# Patient Record
Sex: FEMALE | Race: BLACK OR AFRICAN AMERICAN | NOT HISPANIC OR LATINO | URBAN - NONMETROPOLITAN AREA
[De-identification: names, ages, dates, MRNs, and addresses within clinical notes are randomized per-mention and may not be internally consistent; named-entity substitution may affect disease eponyms.]

---

## 2018-09-27 ENCOUNTER — OFFICE (OUTPATIENT)
Dept: URBAN - NONMETROPOLITAN AREA CLINIC 13 | Facility: CLINIC | Age: 43
End: 2018-09-27

## 2018-09-27 ENCOUNTER — OFFICE (OUTPATIENT)
Dept: URBAN - NONMETROPOLITAN AREA CLINIC 13 | Facility: CLINIC | Age: 43
End: 2018-09-27
Payer: COMMERCIAL

## 2018-09-27 VITALS
WEIGHT: 293 LBS | DIASTOLIC BLOOD PRESSURE: 99 MMHG | HEART RATE: 87 BPM | HEIGHT: 64 IN | SYSTOLIC BLOOD PRESSURE: 125 MMHG | DIASTOLIC BLOOD PRESSURE: 88 MMHG | SYSTOLIC BLOOD PRESSURE: 149 MMHG | OXYGEN SATURATION: 98 %

## 2018-09-27 VITALS — HEIGHT: 64 IN

## 2018-09-27 DIAGNOSIS — R11.0 NAUSEA: ICD-10-CM

## 2018-09-27 DIAGNOSIS — R13.10 DYSPHAGIA, UNSPECIFIED: ICD-10-CM

## 2018-09-27 DIAGNOSIS — Z79.899 OTHER LONG TERM (CURRENT) DRUG THERAPY: ICD-10-CM

## 2018-09-27 DIAGNOSIS — K59.00 CONSTIPATION, UNSPECIFIED: ICD-10-CM

## 2018-09-27 DIAGNOSIS — E83.52 HYPERCALCEMIA: ICD-10-CM

## 2018-09-27 DIAGNOSIS — R10.826 EPIGASTRIC REBOUND ABDOMINAL TENDERNESS: ICD-10-CM

## 2018-09-27 DIAGNOSIS — R10.13 EPIGASTRIC PAIN: ICD-10-CM

## 2018-09-27 DIAGNOSIS — K21.9 GASTRO-ESOPHAGEAL REFLUX DISEASE WITHOUT ESOPHAGITIS: ICD-10-CM

## 2018-09-27 LAB
CBC SYSMEX: HEMATOCRIT: 38 % (ref 37–47)
CBC SYSMEX: HEMOGLOBIN: 13.2 G/DL (ref 12–14)
CBC SYSMEX: LYMPHS %: 39.4 % (ref 20.5–40.5)
CBC SYSMEX: LYMPHS ABSOLUTE: 3.3 10*3U/L — HIGH (ref 1.3–2.9)
CBC SYSMEX: MCH: 32.2 PG — HIGH (ref 27–32)
CBC SYSMEX: MCHC: 34.7 G/DL (ref 28.5–35)
CBC SYSMEX: MCV: 92.7 FL (ref 84–100)
CBC SYSMEX: MONOS %: 8 % (ref 2–10)
CBC SYSMEX: MONOS ABSOLUTE: 0.7 10*3U/L (ref 0.3–3.8)
CBC SYSMEX: MPV: 8.6 FL (ref 8.3–11.9)
CBC SYSMEX: NEUT. %: 52.6 % (ref 43–67)
CBC SYSMEX: NEUT. ABSOLUTE: 4.3 10*3U/L (ref 2.2–4.8)
CBC SYSMEX: PLATELETS: 376 10*3U/L (ref 130–440)
CBC SYSMEX: RBC: 4.1 10*6U/L — LOW (ref 4.2–5.4)
CBC SYSMEX: RDW: 13.8 % (ref 11.5–15.5)
CBC SYSMEX: WBC: 8.3 10*3U/L (ref 4.5–10.5)
COMPLETE METABOLIC: ALBUMIN: 4.2 G/DL (ref 3.5–5.2)
COMPLETE METABOLIC: ALK. PHOS: 121 U/L (ref 40–150)
COMPLETE METABOLIC: ALT: 15 U/L (ref 0–55)
COMPLETE METABOLIC: AST: 13 U/L (ref 5–34)
COMPLETE METABOLIC: BUN: 9 MG/DL (ref 7–26)
COMPLETE METABOLIC: CALCIUM: 10.6 MG/DL — HIGH (ref 8.4–10.3)
COMPLETE METABOLIC: CHLORIDE: 102 MMOL/L (ref 98–107)
COMPLETE METABOLIC: CO2: 28 MEQ/L (ref 22–29)
COMPLETE METABOLIC: CREATININE: 0.8 MG/DL (ref 0.57–1.25)
COMPLETE METABOLIC: GFR - AFRICAN AMERICAN: 100.4 (ref 59–200)
COMPLETE METABOLIC: GFR - NON AFRICAN AMERICAN: 82.8 (ref 59–200)
COMPLETE METABOLIC: GLUCOSE: 100 MG/DL — HIGH (ref 70–99)
COMPLETE METABOLIC: POTASSIUM: 4.4 MMOL/L (ref 3.5–5.3)
COMPLETE METABOLIC: SODIUM: 139 MMOL/L (ref 136–145)
COMPLETE METABOLIC: TOTAL BILIRUBIN: 0.4 MG/DL (ref 0.2–1.2)
COMPLETE METABOLIC: TOTAL PROTEIN: 7 G/DL (ref 6.4–8.3)

## 2018-09-27 PROCEDURE — 76700 US EXAM ABDOM COMPLETE: CPT

## 2018-09-27 PROCEDURE — 36415 COLL VENOUS BLD VENIPUNCTURE: CPT | Performed by: INTERNAL MEDICINE

## 2018-09-27 PROCEDURE — 85025 COMPLETE CBC W/AUTO DIFF WBC: CPT

## 2018-09-27 PROCEDURE — 99214 OFFICE O/P EST MOD 30 MIN: CPT | Mod: 25

## 2018-09-27 PROCEDURE — 80053 COMPREHEN METABOLIC PANEL: CPT

## 2018-09-27 RX ORDER — POLYETHYLENE GLYCOL 3350 17 G/17G
POWDER, FOR SOLUTION ORAL
Qty: 527 | Refills: 0 | Status: ACTIVE
Start: 2018-09-27

## 2018-10-02 ENCOUNTER — OFFICE (OUTPATIENT)
Dept: URBAN - NONMETROPOLITAN AREA CLINIC 13 | Facility: CLINIC | Age: 43
End: 2018-10-02

## 2018-10-02 VITALS — HEIGHT: 64 IN

## 2018-10-02 DIAGNOSIS — R10.826 EPIGASTRIC REBOUND ABDOMINAL TENDERNESS: ICD-10-CM

## 2018-10-02 LAB
AMYLASE: 91.8 U/L (ref 25–125)
LIPASE: 20 U/L (ref 8–78)

## 2018-10-02 PROCEDURE — 36415 COLL VENOUS BLD VENIPUNCTURE: CPT

## 2018-10-02 PROCEDURE — 82150 ASSAY OF AMYLASE: CPT

## 2018-10-02 PROCEDURE — 83690 ASSAY OF LIPASE: CPT

## 2018-10-24 ENCOUNTER — AMBULATORY SURGICAL CENTER (OUTPATIENT)
Dept: URBAN - NONMETROPOLITAN AREA SURGERY 2 | Facility: SURGERY | Age: 43
End: 2018-10-24
Payer: COMMERCIAL

## 2018-10-24 ENCOUNTER — OFFICE (OUTPATIENT)
Dept: URBAN - NONMETROPOLITAN AREA CLINIC 13 | Facility: CLINIC | Age: 43
End: 2018-10-24
Payer: COMMERCIAL

## 2018-10-24 VITALS
OXYGEN SATURATION: 97 % | RESPIRATION RATE: 22 BRPM | DIASTOLIC BLOOD PRESSURE: 88 MMHG | SYSTOLIC BLOOD PRESSURE: 146 MMHG | RESPIRATION RATE: 18 BRPM | SYSTOLIC BLOOD PRESSURE: 137 MMHG | HEART RATE: 98 BPM | SYSTOLIC BLOOD PRESSURE: 142 MMHG | WEIGHT: 293 LBS | OXYGEN SATURATION: 96 % | HEIGHT: 64 IN | HEART RATE: 92 BPM | DIASTOLIC BLOOD PRESSURE: 90 MMHG | DIASTOLIC BLOOD PRESSURE: 83 MMHG | HEART RATE: 69 BPM | OXYGEN SATURATION: 100 % | SYSTOLIC BLOOD PRESSURE: 140 MMHG | DIASTOLIC BLOOD PRESSURE: 97 MMHG | SYSTOLIC BLOOD PRESSURE: 144 MMHG | HEART RATE: 91 BPM | OXYGEN SATURATION: 99 % | SYSTOLIC BLOOD PRESSURE: 136 MMHG | RESPIRATION RATE: 20 BRPM | DIASTOLIC BLOOD PRESSURE: 91 MMHG

## 2018-10-24 DIAGNOSIS — K22.2 ESOPHAGEAL OBSTRUCTION: ICD-10-CM

## 2018-10-24 DIAGNOSIS — K21.9 GASTRO-ESOPHAGEAL REFLUX DISEASE WITHOUT ESOPHAGITIS: ICD-10-CM

## 2018-10-24 DIAGNOSIS — K44.9 DIAPHRAGMATIC HERNIA WITHOUT OBSTRUCTION OR GANGRENE: ICD-10-CM

## 2018-10-24 DIAGNOSIS — K31.89 OTHER DISEASES OF STOMACH AND DUODENUM: ICD-10-CM

## 2018-10-24 DIAGNOSIS — K29.70 GASTRITIS, UNSPECIFIED, WITHOUT BLEEDING: ICD-10-CM

## 2018-10-24 DIAGNOSIS — R13.10 DYSPHAGIA, UNSPECIFIED: ICD-10-CM

## 2018-10-24 PROCEDURE — 88305 TISSUE EXAM BY PATHOLOGIST: CPT | Mod: TC | Performed by: INTERNAL MEDICINE

## 2018-10-24 PROCEDURE — 43248 EGD GUIDE WIRE INSERTION: CPT | Performed by: INTERNAL MEDICINE

## 2018-10-24 PROCEDURE — 00731 ANES UPR GI NDSC PX NOS: CPT | Mod: QS,QZ

## 2018-10-24 PROCEDURE — 43239 EGD BIOPSY SINGLE/MULTIPLE: CPT | Mod: 59 | Performed by: INTERNAL MEDICINE

## 2018-10-24 RX ORDER — RANITIDINE HYDROCHLORIDE 300 MG/1
TABLET, FILM COATED ORAL
Qty: 30 | Refills: 5 | Status: COMPLETED
Start: 2016-06-21 | End: 2018-10-24

## 2018-10-24 RX ORDER — OMEPRAZOLE 40 MG/1
CAPSULE, DELAYED RELEASE PELLETS ORAL
Qty: 0 | Refills: 0 | Status: COMPLETED
End: 2018-10-24

## 2018-10-24 RX ORDER — PANTOPRAZOLE SODIUM 40 MG/1
TABLET, DELAYED RELEASE ORAL
Qty: 30 | Refills: 5 | Status: ACTIVE
Start: 2018-10-24

## 2018-10-24 RX ORDER — FAMOTIDINE 20 MG/1
TABLET, FILM COATED ORAL
Qty: 60 | Refills: 5 | Status: ACTIVE
Start: 2018-10-24

## 2018-10-26 LAB — SURGICAL PROCEDURE: PDF REPORT: (no result)

## 2023-11-14 ENCOUNTER — OFFICE (OUTPATIENT)
Dept: URBAN - NONMETROPOLITAN AREA CLINIC 13 | Facility: CLINIC | Age: 48
End: 2023-11-14
Payer: COMMERCIAL

## 2023-11-14 VITALS
SYSTOLIC BLOOD PRESSURE: 135 MMHG | OXYGEN SATURATION: 96 % | SYSTOLIC BLOOD PRESSURE: 143 MMHG | DIASTOLIC BLOOD PRESSURE: 80 MMHG | DIASTOLIC BLOOD PRESSURE: 95 MMHG | WEIGHT: 293 LBS | HEIGHT: 64 IN | HEART RATE: 113 BPM

## 2023-11-14 VITALS — HEIGHT: 64 IN

## 2023-11-14 DIAGNOSIS — K21.9 GASTRO-ESOPHAGEAL REFLUX DISEASE WITHOUT ESOPHAGITIS: ICD-10-CM

## 2023-11-14 DIAGNOSIS — K59.00 CONSTIPATION, UNSPECIFIED: ICD-10-CM

## 2023-11-14 DIAGNOSIS — R10.13 EPIGASTRIC PAIN: ICD-10-CM

## 2023-11-14 DIAGNOSIS — R11.0 NAUSEA: ICD-10-CM

## 2023-11-14 DIAGNOSIS — D64.9 ANEMIA, UNSPECIFIED: ICD-10-CM

## 2023-11-14 DIAGNOSIS — Z12.11 ENCOUNTER FOR SCREENING FOR MALIGNANT NEOPLASM OF COLON: ICD-10-CM

## 2023-11-14 DIAGNOSIS — R10.33 PERIUMBILICAL PAIN: ICD-10-CM

## 2023-11-14 DIAGNOSIS — R13.10 DYSPHAGIA, UNSPECIFIED: ICD-10-CM

## 2023-11-14 LAB
AMYLASE: 71 UNITS/L (ref 30–110)
CBC WITH WBC (DIFF): ACANTHOCYTE: NORMAL
CBC WITH WBC (DIFF): AGRANULAR NEUTROPHIL: NORMAL
CBC WITH WBC (DIFF): ANISOCYTOSIS: NORMAL
CBC WITH WBC (DIFF): ATYPICAL LYMPHOCYTE: NORMAL
CBC WITH WBC (DIFF): AUER RODS: NORMAL
CBC WITH WBC (DIFF): BAND: NORMAL
CBC WITH WBC (DIFF): BASOPHIL: 0 % (ref 0–1)
CBC WITH WBC (DIFF): BASOPHILIC STIPPLING: NORMAL
CBC WITH WBC (DIFF): BI-LOBED NEUTROPHIL: NORMAL
CBC WITH WBC (DIFF): BLAST: NORMAL
CBC WITH WBC (DIFF): BURR CELL: NORMAL
CBC WITH WBC (DIFF): DIMORPHIC RBC POPULATION: NORMAL
CBC WITH WBC (DIFF): DOHLE BODIES: NORMAL
CBC WITH WBC (DIFF): ELLIPTOCYTE: NORMAL
CBC WITH WBC (DIFF): EOSINOPHIL: 2 % (ref 0–5)
CBC WITH WBC (DIFF): GIANT PLATELET: NORMAL
CBC WITH WBC (DIFF): HEMATOCRIT: 38.8 % (ref 36–46)
CBC WITH WBC (DIFF): HEMOGLOBIN: 13.5 G/DL (ref 12–16)
CBC WITH WBC (DIFF): HOWELL JOLLY BODIES: NORMAL
CBC WITH WBC (DIFF): HYPERSEGMENTED NEUTROPHIL: NORMAL
CBC WITH WBC (DIFF): HYPOCHROMIA: NORMAL
CBC WITH WBC (DIFF): HYPOGRANULAR NEUTROPHIL: NORMAL
CBC WITH WBC (DIFF): IMMATURE GRANULOCYTES: 0 % (ref 0–1)
CBC WITH WBC (DIFF): LARGE PLATELET: NORMAL
CBC WITH WBC (DIFF): LYMPHOCYTE: 42 % — HIGH (ref 20–40)
CBC WITH WBC (DIFF): MACROCYTOSIS: NORMAL
CBC WITH WBC (DIFF): MEAN CELL HEMOGLOBIN CONCENTRATION: 34.8 G/DL (ref 33–37)
CBC WITH WBC (DIFF): MEAN CELL HEMOGLOBIN: 32.8 PG — HIGH (ref 27–31)
CBC WITH WBC (DIFF): MEAN CELL VOLUME: 94 FL (ref 84–100)
CBC WITH WBC (DIFF): MEAN PLATELET VOLUME: 8.7 FL (ref 8.3–11.9)
CBC WITH WBC (DIFF): METAMYELOCYTE: NORMAL
CBC WITH WBC (DIFF): MICROCYTOSIS: NORMAL
CBC WITH WBC (DIFF): MIX CELLS: NORMAL
CBC WITH WBC (DIFF): MONOCYTE: 8 % (ref 1–10)
CBC WITH WBC (DIFF): MYELOCYTE: NORMAL
CBC WITH WBC (DIFF): NEUTROPHIL SEGMENTED: 46 % — LOW (ref 50–70)
CBC WITH WBC (DIFF): NOTE: NORMAL
CBC WITH WBC (DIFF): NUCLEATED RBC: 0 /100WBC (ref 0–0)
CBC WITH WBC (DIFF): OVALOCYTE: NORMAL
CBC WITH WBC (DIFF): PAPPENHEIMER BODIES: NORMAL
CBC WITH WBC (DIFF): PATHOLOGIST INTERPRETATION: NORMAL
CBC WITH WBC (DIFF): PLATELET CLUMPS: NORMAL
CBC WITH WBC (DIFF): PLATELET COUNT: 327 /NL (ref 140–440)
CBC WITH WBC (DIFF): PLT SATELLITOSIS: NORMAL
CBC WITH WBC (DIFF): POIKILOCYTOSIS: NORMAL
CBC WITH WBC (DIFF): POLYCHROMASIA: NORMAL
CBC WITH WBC (DIFF): PROMYELOCYTE: NORMAL
CBC WITH WBC (DIFF): RBC MORPHOLOGY: NORMAL
CBC WITH WBC (DIFF): REACT LYMPH ABS MAN: NORMAL
CBC WITH WBC (DIFF): REACTIVE LYMPHOCYTE: NORMAL
CBC WITH WBC (DIFF): RED BLOOD CELL: 4.12 /PL — LOW (ref 4.2–5.4)
CBC WITH WBC (DIFF): RED CELL DIAMETER WIDTH: 47 FL (ref 35–48)
CBC WITH WBC (DIFF): ROULEAUX RBC: NORMAL
CBC WITH WBC (DIFF): SCHISTOCYTE: NORMAL
CBC WITH WBC (DIFF): SICKLE CELL: NORMAL
CBC WITH WBC (DIFF): SMUDGE CELLS: NORMAL
CBC WITH WBC (DIFF): SPHEROCYTE: NORMAL
CBC WITH WBC (DIFF): STOMATOCYTE: NORMAL
CBC WITH WBC (DIFF): TARGET CELL: NORMAL
CBC WITH WBC (DIFF): TEAR DROP CELL: NORMAL
CBC WITH WBC (DIFF): TOXIC GRANULATION: NORMAL
CBC WITH WBC (DIFF): UNCLASSIFIED CELL: NORMAL
CBC WITH WBC (DIFF): VACUOLATED NEUTROPHIL: NORMAL
CBC WITH WBC (DIFF): WBC MORPHOLOGY: NORMAL
CBC WITH WBC (DIFF): WHITE BLOOD CELL: 7.6 /NL (ref 4.8–11)
COMPREHENSIVE METABOLIC PANEL: ALBUMIN: 4.3 G/DL (ref 3.5–4.8)
COMPREHENSIVE METABOLIC PANEL: ALKALINE PHOSPHATASE: 114 UNITS/L (ref 36–126)
COMPREHENSIVE METABOLIC PANEL: ALT: 22 UNITS/L (ref ?–34)
COMPREHENSIVE METABOLIC PANEL: ANION GAP: 12 MMOL/L (ref 7–16)
COMPREHENSIVE METABOLIC PANEL: AST: 24 UNITS/L (ref 14–36)
COMPREHENSIVE METABOLIC PANEL: BILIRUBIN, TOTAL: 0.6 MG/DL (ref 0.2–1.3)
COMPREHENSIVE METABOLIC PANEL: BUN/CREATININE RATIO: 11.7
COMPREHENSIVE METABOLIC PANEL: CALCIUM: 10.4 MG/DL — HIGH (ref 8.4–10.2)
COMPREHENSIVE METABOLIC PANEL: CARBON DIOXIDE: 31 MMOL/L — HIGH (ref 22–30)
COMPREHENSIVE METABOLIC PANEL: CHLORIDE: 99 MMOL/L (ref 98–107)
COMPREHENSIVE METABOLIC PANEL: CREATININE: 0.94 MG/DL (ref 0.52–1.04)
COMPREHENSIVE METABOLIC PANEL: GLUCOSE: 109 MG/DL — HIGH (ref 65–105)
COMPREHENSIVE METABOLIC PANEL: POTASSIUM: 3.9 MMOL/L (ref 3.5–5.3)
COMPREHENSIVE METABOLIC PANEL: PROTEIN, TOTAL, SERUM: 7.8 G/DL (ref 6.3–8.2)
COMPREHENSIVE METABOLIC PANEL: SODIUM: 142 MMOL/L (ref 137–145)
COMPREHENSIVE METABOLIC PANEL: UREA NITROGEN (BUN): 11 MG/DL (ref 7–17)
FERRITIN: 107 NG/ML (ref 6–137)
GFR: EGFR AFRICAN AMERICAN: >60 ML/MIN/1.73M2
GFR: EGFR NON-AFR.AMERICAN: >60 ML/MIN/1.73M2
IRON AND TOTAL IRON BINDING CAPACITY: % SATURATION: 28 % (ref 11–40)
IRON AND TOTAL IRON BINDING CAPACITY: IRON BINDING CAPACITY: 250 MCG/DL — LOW (ref 265–497)
IRON AND TOTAL IRON BINDING CAPACITY: IRON, TOTAL: 70 MCG/DL (ref 37–170)
LIPASE: 41 UNITS/L (ref 23–300)
TSH: 1.81 MICRO-INTL UNIT (ref 0.47–4.68)
VITAMIN B12/FOLATE, SERUM PANEL: FOLATE, SERUM: 5.59 NG/ML (ref 2.76–20)
VITAMIN B12/FOLATE, SERUM PANEL: VITAMIN B12: 415 PG/ML (ref 239–931)

## 2023-11-14 PROCEDURE — 76700 US EXAM ABDOM COMPLETE: CPT | Mod: TC | Performed by: NURSE PRACTITIONER

## 2023-11-14 PROCEDURE — 99205 OFFICE O/P NEW HI 60 MIN: CPT | Mod: 25 | Performed by: NURSE PRACTITIONER

## 2023-11-14 RX ORDER — PANTOPRAZOLE 40 MG/1
TABLET, DELAYED RELEASE ORAL
Qty: 90 | Refills: 1 | Status: ACTIVE

## 2024-01-04 PROBLEM — K21.9 GERD: Status: ACTIVE | Noted: 2018-10-24

## 2025-01-23 ENCOUNTER — OFFICE (OUTPATIENT)
Dept: URBAN - NONMETROPOLITAN AREA CLINIC 1 | Facility: CLINIC | Age: 50
End: 2025-01-23
Payer: COMMERCIAL

## 2025-01-23 VITALS
HEIGHT: 64 IN | WEIGHT: 293 LBS | DIASTOLIC BLOOD PRESSURE: 102 MMHG | SYSTOLIC BLOOD PRESSURE: 161 MMHG | HEART RATE: 88 BPM

## 2025-01-23 DIAGNOSIS — R10.13 EPIGASTRIC PAIN: ICD-10-CM

## 2025-01-23 DIAGNOSIS — E66.9 OBESITY, UNSPECIFIED: ICD-10-CM

## 2025-01-23 DIAGNOSIS — K92.0 HEMATEMESIS: ICD-10-CM

## 2025-01-23 DIAGNOSIS — Z99.81 DEPENDENCE ON SUPPLEMENTAL OXYGEN: ICD-10-CM

## 2025-01-23 DIAGNOSIS — R11.2 NAUSEA WITH VOMITING, UNSPECIFIED: ICD-10-CM

## 2025-01-23 DIAGNOSIS — R19.7 DIARRHEA, UNSPECIFIED: ICD-10-CM

## 2025-01-23 PROCEDURE — 99214 OFFICE O/P EST MOD 30 MIN: CPT | Performed by: NURSE PRACTITIONER

## 2025-01-23 RX ORDER — SUCRALFATE 1 G/1
3 TABLET ORAL
Qty: 90 | Refills: 2 | Status: ACTIVE
Start: 2025-01-23

## 2025-01-23 NOTE — SERVICENOTES
Risks of procedures explained to patient and she wishes to proceed
Bowel prep prescribed and instructions given to patient
EGD for s/s as above, concern for PUD, hpylori, UGIB, -continue ppi therapy and start Carafate as rx'd above
Colonoscopy for diarrhea, change in BM, crc screening
Labs and stool studies as above to r/o infectious etiology of diarrhea
Avoid nsaids
Increase fiber and water intake daily
Hold all GLP-1 injections 7 days ac procedure
Cardiac clearance ac procedure-Dr. Jay, then procedure to be performed in hospital setting d/t bmi and comorbidities
f/u after procedures.

## 2025-01-23 NOTE — SERVICEHPINOTES
Patient with a history of CAD s/p CABG, GERD, HTN, HLD, presents to the clinic today for diarrhea and vomiting.  She c/o abdominal cramping, diarrhea daily for 2 weeks, and has had a few episodes of vomiting no correlation to meal or dietary intake.  She reports hematemesis last episode was last Friday.  Her s/s have improved a little bit in the last few days and has been able to eat some food in the last couple days.  She states the only thing that helps is taking Carafate however she is out of this med.  Has chronic gerd managed with Pantoprazole 40mg po qd.  Reports the diarrhea is improving, formed stools Wednesday, and no diarrhea today.  Her last EGD was via Dr. Caballero 2018 gastritis.  Last colonoscopy via Dr. Caballero in 2011 no report to review.  Denies melena, hematochezia, unintentional weight loss, or fever.  Has been off Semaglutide injections since 11/2024.  She denies dysphagia.  Only takes Ibuprofen sparingly, denies excessive use of NSAIDs.  Denies cardiac stents, anticoagulation therapy, ckd.  Wears supplemental O2 at home prn.  br
br br
[facility] 2023
brIAGNOSTIC CORONARY ANGIOGRAPHY: brLM: Angiographically normalbrLAD: Angiographically normalbrLCx: Angiographically normalbrRCA: Anomalous origin and unable to selectively engaged. Has been bypassedbrBYPASS GRAFT ANGIOGRAPHY:brSVG to RCA: Widely patent 30-40% stenosis in mid SVG graft.brLEFT HEART CATHETERIZATION: brLVEDP was _ mmHg. brThere is no aortic valve gradient.br 
br
Abd US 12/2024
brFINDINGS:brThe liver is normal in hyperechoic without focal abnormality or intrahepatic biliary ductal dilatation. The main portal vein is patent and antegrade with normal spectral Doppler waveform.brNo gallbladder wall thickening or pericholecystic fluid. No stones are visualized. Negative sonographic Delcid sign. The common bile duct is normal in caliber.brThe tail of the pancreas could not be visualized due to overlying bowel gas. The visualized pancreas is hyperechoic.brThe spleen is normal in size and echogenicity without focal abnormality.brThe kidneys are symmetric in size and echogenicity without hydronephrosis. No suspicious focal lesion.brThe IVC is patent with color Doppler flow. The aorta is patent and normal in caliber.brThere is no free fluid.brIMPRESSION:brNo acute sonographic abnormality.brEchogenic liver, suggestive of steatosis.brEchogenic pancreas, favoring fatty replacement. br
br   CT AP w/ contrast 10/2024
brFINDINGS:brBorderline heart size. Prior sternotomy. Patchy lung base atelectasis.brAbdomen: There is a spinal stimulator lead or pump extending into the spinal canal with present more in the posterior right flank adipose tissues. Additional catheter lead extends into the abdomen, terminating in the upper pelvis.brPrior cholecystectomy. The liver, spleen, stomach, pancreas, adrenal glands, and kidneys show acute abnormality. No bowel obstruction. Normal appendix. There is no diverticulitis. No ascites or free air. Normal caliber aorta. No abdominal brlymphadenopathy by size criteria.brPelvis: Absent uterus. Unremarkable urinary bladder. No pelvic ascites or lymphadenopathy by size criteria.brSkeletal: No suspicious bone lesion.brIMPRESSION:brNo acute abnormality within the abdomen or pelvis.brPostoperative/postprocedural changes present as above.

## 2025-04-03 ENCOUNTER — OFFICE (OUTPATIENT)
Dept: URBAN - NONMETROPOLITAN AREA CLINIC 1 | Facility: CLINIC | Age: 50
End: 2025-04-03
Payer: COMMERCIAL

## 2025-04-03 VITALS
DIASTOLIC BLOOD PRESSURE: 72 MMHG | HEART RATE: 95 BPM | SYSTOLIC BLOOD PRESSURE: 110 MMHG | WEIGHT: 293 LBS | HEIGHT: 64 IN

## 2025-04-03 DIAGNOSIS — R11.0 NAUSEA: ICD-10-CM

## 2025-04-03 DIAGNOSIS — R10.13 EPIGASTRIC PAIN: ICD-10-CM

## 2025-04-03 DIAGNOSIS — K59.00 CONSTIPATION, UNSPECIFIED: ICD-10-CM

## 2025-04-03 DIAGNOSIS — K25.9 GASTRIC ULCER, UNSPECIFIED AS ACUTE OR CHRONIC, WITHOUT HEMO: ICD-10-CM

## 2025-04-03 PROCEDURE — 99214 OFFICE O/P EST MOD 30 MIN: CPT | Performed by: NURSE PRACTITIONER

## 2025-04-03 RX ORDER — SUCRALFATE 1 G/1
4 TABLET ORAL
Qty: 120 | Refills: 3 | Status: ACTIVE
Start: 2025-04-03

## 2025-04-03 RX ORDER — DICYCLOMINE HYDROCHLORIDE 20 MG/1
40 TABLET ORAL
Qty: 60 | Refills: 2 | Status: ACTIVE
Start: 2025-04-03

## 2025-04-03 NOTE — SERVICENOTES
Reviewed previous EGD/Colonoscopy performed 3/25 outlined above
refill carafate qid to help with pain postprandially.  and continue ppi ac breakfast as prescribed.
Repeat EGD 3 months for surveillance of gastric ulcer patient is already scheduled 06/16/2025
Encouraged patient to continue bland diet
Start MiraLax and stool softeners daily to help with constipation -patient takes hydrocodone every 4-6 hours and could be contributing to lower abdominal cramping and constipation issues 
She requests refill of Dicyclomine- stressed importance to maintain anti-constipation regimen as can cause constipation 
Avoid all NSAIDs
EGD pathology pending patient will be notified of results once MD reviews and signs off 
Follow up as needed
Recall colonoscopy 10 years

## 2025-04-03 NOTE — SERVICEHPINOTES
patient presents to the clinic today for epigastric pain, nausea, follow up of EGD.  She recently underwent EGD that revealed forced 3 gastric ulcer biopsies pending.  She was prescribed pantoprazole 40 mg once daily and she is taking pantoprazole 40mg po qd as well as Carafate bid.  She c/o epigastric pain that barely has any relief with medications.  She can keep food down, but is scared to eat s/t pain.  She has some nausea but denies vomiting.  denies melena, hematochezia, hematemesis.  Her weight is stable actually she is up 4 lb since last office visit.  Colonoscopy was normal with recommendations to repeat screening in 10 years.  She has lower abdominal cramping at times as well, and requests refill of dicyclomine.  Has a BM once qod-takes HCD q 4-6 hrs as prescribed by PCP.  br
br
br    EGD w/ dil by Dr. Mendoza 3/27/25br
he cricopharynx, upper third of the esophagus, middle third of the esophagus, lower third of the esophagus, GE junction and Z-line appeared normal.brDilated in the esophagus with Savary-Chato dilator to 54 Fr ending size. Dilation caused no changebrModerate edematous, erythematous mucosa in the body of the stomach and antrum performed cold forceps biopsybrSingle ulcer in the antrum with clean base (Farhad III) performed cold forceps biopsybrThe duodenal bulb, 2nd part of the duodenum and 3rd part of the duodenum appeared normal.RecommendationbrAwait pathology resultsbrThe patient was found To have a small ulcer and gastritis. Follow up the biopsy results. Start Protonix 40 mg a.c. breakfast. Avoid NSAIDs. Will proceed with a colonoscopy today.br 
br Colonoscopy by Dr. Mendoza 3/27/25
brThe ileocecal valve, appendiceal orifice, cecum, ascending colon, hepatic flexure, transverse colon, splenic flexure, descending colon, sigmoid colon, rectosigmoid, rectum and anal region appeared normal.Recommendation
br repeat EGD in 3 months brRepeat screening colonoscopy in 10 years, due: 3/25/2035 
br
br
br Per OV PEDRITO Rosario-C 1/2025
brPatient with a history of CAD s/p CABG, GERD, HTN, HLD, presents to the clinic today for diarrhea and vomiting. She c/o abdominal cramping, diarrhea daily for 2 weeks, and has had a few episodes of vomiting no correlation to meal or dietary intake. She reports hematemesis last episode was last Friday. Her s/s have improved a little bit in the last few days and has been able to eat some food in the last couple days. She states the only thing that helps is taking Carafate however she is out of this med. Has chronic gerd managed with Pantoprazole 40mg po qd. Reports the diarrhea is improving, formed stools Wednesday, and no diarrhea today. Her last EGD was via Dr. Caballero 2018 gastritis. Last colonoscopy via Dr. Caballero in 2011 no report to review. Denies melena, hematochezia, unintentional weight loss, or fever. Has been off Semaglutide injections since 11/2024. She denies dysphagia. Only takes Ibuprofen sparingly, denies excessive use of NSAIDs. Denies cardiac stents, anticoagulation therapy, ckd. Wears supplemental O2 at home prn. br[facility] 2023brIAGNOSTIC CORONARY ANGIOGRAPHY:brLM: Angiographically normalbrLAD: Angiographically normalbrLCx: Angiographically normalbrRCA: Anomalous origin and unable to selectively engaged. Has been bypassedbrBYPASS GRAFT ANGIOGRAPHY:brSVG to RCA: Widely patent 30-40% stenosis in mid SVG graft.brLEFT HEART CATHETERIZATION:brLVEDP was _ mmHg.brThere is no aortic valve gradient.Abd US 12/2024brFINDINGS:brThe liver is normal in hyperechoic without focal abnormality or intrahepatic biliary ductal dilatation. The main portal vein is patent and antegrade with normal spectral Doppler waveform.brNo gallbladder wall thickening or pericholecystic fluid. No stones are visualized. Negative sonographic Delcid sign. The common bile duct is normal in caliber.brThe tail of the pancreas could not be visualized due to overlying bowel gas. The visualized pancreas is hyperechoic.brThe spleen is normal in size and echogenicity without focal abnormality.brThe kidneys are symmetric in size and echogenicity without hydronephrosis. No suspicious focal lesion.brThe IVC is patent with color Doppler flow. The aorta is patent and normal in caliber.brThere is no free fluid.brIMPRESSION:brNo acute sonographic abnormality.brEchogenic liver, suggestive of steatosis.brEchogenic pancreas, favoring fatty replacement.CT AP w/ contrast 10/2024brFINDINGS:brBorderline heart size. Prior sternotomy. Patchy lung base atelectasis.brAbdomen: There is a spinal stimulator lead or pump extending into the spinal canal with present more in the posterior right flank adipose tissues. Additional catheter lead extends into the abdomen, terminating in the upper pelvis.brPrior cholecystectomy. The liver, spleen, stomach, pancreas, adrenal glands, and kidneys show acute abnormality. No bowel obstruction. Normal appendix. There is no diverticulitis. No ascites or free air. Normal caliber aorta. No abdominalbrlymphadenopathy by size criteria.brPelvis: Absent uterus. Unremarkable urinary bladder. No pelvic ascites or lymphadenopathy by size criteria.brSkeletal: No suspicious bone lesion.brIMPRESSION:brNo acute abnormality within the abdomen or pelvis.brPostoperative/postprocedural changes present as above.

## 2025-06-16 ENCOUNTER — ON CAMPUS - OUTPATIENT (OUTPATIENT)
Dept: URBAN - NONMETROPOLITAN AREA HOSPITAL 34 | Facility: HOSPITAL | Age: 50
End: 2025-06-16
Payer: COMMERCIAL

## 2025-06-16 DIAGNOSIS — K29.50 UNSPECIFIED CHRONIC GASTRITIS WITHOUT BLEEDING: ICD-10-CM

## 2025-06-16 PROCEDURE — 43239 EGD BIOPSY SINGLE/MULTIPLE: CPT | Performed by: INTERNAL MEDICINE
